# Patient Record
Sex: FEMALE | Race: WHITE | NOT HISPANIC OR LATINO
[De-identification: names, ages, dates, MRNs, and addresses within clinical notes are randomized per-mention and may not be internally consistent; named-entity substitution may affect disease eponyms.]

---

## 2019-03-11 ENCOUNTER — FORM ENCOUNTER (OUTPATIENT)
Age: 54
End: 2019-03-11

## 2019-11-17 ENCOUNTER — FORM ENCOUNTER (OUTPATIENT)
Age: 54
End: 2019-11-17

## 2020-12-20 ENCOUNTER — FORM ENCOUNTER (OUTPATIENT)
Age: 55
End: 2020-12-20

## 2021-01-13 ENCOUNTER — FORM ENCOUNTER (OUTPATIENT)
Age: 56
End: 2021-01-13

## 2022-02-22 PROBLEM — Z00.00 ENCOUNTER FOR PREVENTIVE HEALTH EXAMINATION: Status: ACTIVE | Noted: 2022-02-22

## 2022-03-01 ENCOUNTER — APPOINTMENT (OUTPATIENT)
Dept: OBGYN | Facility: CLINIC | Age: 57
End: 2022-03-01
Payer: COMMERCIAL

## 2022-03-01 ENCOUNTER — NON-APPOINTMENT (OUTPATIENT)
Age: 57
End: 2022-03-01

## 2022-03-01 VITALS
WEIGHT: 163 LBS | BODY MASS INDEX: 30 KG/M2 | TEMPERATURE: 98.1 F | HEART RATE: 79 BPM | HEIGHT: 62 IN | SYSTOLIC BLOOD PRESSURE: 143 MMHG | DIASTOLIC BLOOD PRESSURE: 84 MMHG

## 2022-03-01 PROCEDURE — 99396 PREV VISIT EST AGE 40-64: CPT

## 2022-03-01 RX ORDER — ESTRADIOL/NORETHINDRONE ACETATE TRANSDERMAL SYSTEM .05; .14 MG/D; MG/D
0.05-0.14 PATCH, EXTENDED RELEASE TRANSDERMAL
Qty: 3 | Refills: 3 | Status: ACTIVE | COMMUNITY
Start: 2022-03-01 | End: 1900-01-01

## 2022-03-01 NOTE — HISTORY OF PRESENT ILLNESS
[FreeTextEntry1] : here for annual visit\par hot flashes on and off...but  bad when they come\par interfering with her life quality\par \par previous visit with me in [place]\par \par    	Print\par Patient\par Name	CHELO ALMARAZ (54yo, F) ID# [id number]	Appt. Date/Time	2019 04:15PM\par 	1965	Service Dept.	[facility] SI OFFICE\par Provider	MOOSE ECHEVERRIA MD\par Insurance	\par Med Primary: [facility]\par Insurance # : [id number]\par Policy/Group # : [id number]\par Prescription: ESI1 - Member is eligible. details\par Chief Complaint\par Well Woman Visit\par Patient's Care Team\par Primary Care Provider: HANNAH RUTHERFORD MO: [address] [place] [address], Ph [phone], Fax [phone] NPI: [id number]\par Patient's Pharmacies\par [id number] ([facility]): [address] [place] [address], Ph [phone], Fax [phone]\par [id number] ([facility]): [address] [place] [address], Ph [phone], Fax [phone]\par Vitals\par 2019 05:08 pm\par Ht:	5 ft 2 in\par Wt:	159 lbs\par BMI:	29.1\par BP:	122/74 sitting\par Allergies\par Reviewed Allergies\par NKDA\par Medications\par Reviewed Medications\par amoxicillin 500 mg capsule\par 18   filled	[facility]\par amoxicillin 875 mg-potassium clavulanate 125 mg tablet\par 17   filled	[facility]\par DULoxetine 30 mg capsule,delayed release\par 17   filled	[facility]\par DULoxetine 60 mg capsule,delayed release\par 17   filled	[facility]\par Farxiga 5 mg tablet\par 06/17/15   filled	[facility]\par glimepiride 4 mg tablet\par 16   filled	[facility]\par halobetasol propionate 0.05 % topical cream\par 18   filled	[facility]\par hydrocortisone 2.5 % lotion\par 18   filled	[facility]\par hydrOXYzine HCl 10 mg tablet\par 18   filled	[facility]\par ibuprofen 800 mg tablet\par 19   filled	[facility]\par irbesartan 150 mg tablet\par 16   filled	[facility]\par Jardiance 25 mg tablet\par 09/10/16   filled	[facility]\par Jentadueto 2.5 mg-1,000 mg tablet\par 10/10/18   filled	[facility]\par Jentadueto XR 2.5 mg-1,000 mg tablet, extended release\par 10/17/18   filled	[facility]\par levothyroxine 125 mcg tablet\par 16   filled	[facility]\par meloxicam 7.5 mg tablet\par 18   filled	[facility]\par metFORMIN 1,000 mg tablet\par 17   filled	[facility]\par pioglitazone 15 mg tablet\par 18   filled	[facility]\par pravastatin 20 mg tablet\par 16   filled	[facility]\par predniSONE 20 mg tablet\par 18   filled	[facility]\par Prempro 0.3 mg-1.5 mg tablet\par Take 1 tablet(s) every day by oral route for 90 days.\par 19   prescribed	Moose Echeverria MD\par promethazine-DM 6.25 mg-15 mg/5 mL oral syrup\par 18   filled	[facility]\par rosuvastatin 20 mg tablet\par 19   filled	[facility]\par simvastatin 40 mg tablet\par 16   filled	[facility]\par Soliqua 100/33 100 unit-33 mcg/mL subcutaneous insulin pen\par 18   filled	[facility]\par Synthroid 150 mcg tablet\par 18   filled	[facility]\par Synthroid 175 mcg tablet\par 19   filled	[facility]\par tiZANidine 4 mg tablet\par 18   filled	[facility]\par Toujeo SoloStar U-300 Insulin 300 unit/mL (1.5 mL) subcutaneous pen\par 19   filled	[facility]\par Trulicity 0.75 mg/0.5 mL subcutaneous pen injector\par 02/15/17   filled	[facility]\par Unifine Pentips Plus 31 gauge x " needle\par 18   filled	[facility]\par Unithroid 112 mcg tablet\par 16   filled	[facility]\par valACYclovir 1 gram tablet\par 11/15/18   filled	[facility]\par Vitamin D2 50,000 unit capsule\par 16   filled	[facility]\par Problems\par Reviewed Problems\par Vaginitis and vulvovaginitis\par Cystocele\par Urinary incontinence\par Gynecologic examination\par Family History\par Reviewed Family History\par Mother	- Myocardial infarction\par Father	- Malignant tumor of pharynx\par Social History\par Reviewed Social History\par Smoking Status: Former smoker\par Smoker (/ PPD)\par Surgical History\par Reviewed Surgical History\par Other - CARPAL TUNNEL\par Other - CERVIX CRYOSURGERY\par GYN History\par Reviewed GYN History\par LMP: Definite.\par Date of LMP: 05/10/2009.\par Obstetric History\par Reviewed Obstetric History\par TOTAL	FULL	PRE	AB. I	AB. S	ECTOPICS	MULTIPLE	LIVING\par 2	2						2\par  1 CS\par Past Medical History\par Reviewed Past Medical History\par Diabetes: Y\par Heart Conditions: Y - CHOLESTEROL\par Thyroid Problems: Y\par Screening\par None recorded.\par Assessment / Plan\par 1. Gynecologic examination\par Z01.411: Encounter for gynecological examination (general) (routine) with abnormal findings\par MAMMO, SCREENING, BILATERAL\par THINPREPTIS,HPV,E6/E7W/RF\par \par 2. Menopausal and postmenopausal disorders\par N95.9: Unspecified menopausal and perimenopausal disorder\par Prempro 0.3 mg-1.5 mg tablet - Take 1 tablet(s) every day by oral route for 90 days.     Qty: 90 tablet(s)     Refills: 3     Pharmacy: [facility]\par Prempro 0.3 mg-1.5 mg tablet - Take 1 tablet(s) every day by oral route for 28 days.     Qty: 28 tablet(s)     Refills: 3     Pharmacy: [id number]\par \par \par Return to Office\par None recorded.\par Encounter Sign-Off\par Encounter signed-off by Moose Echeverria MD, 2019.\par Encounter performed and documented by Moose Echeverria MD\par Encounter reviewed & signed by Moose Echeverria MD on 2019 at 5:40pm [Hot Flashes] : hot flashes [Night Sweats] : night sweats

## 2022-03-05 LAB — HPV HIGH+LOW RISK DNA PNL CVX: NOT DETECTED

## 2022-03-12 LAB — CYTOLOGY CVX/VAG DOC THIN PREP: NORMAL

## 2022-04-02 ENCOUNTER — RESULT REVIEW (OUTPATIENT)
Age: 57
End: 2022-04-02

## 2022-04-02 ENCOUNTER — OUTPATIENT (OUTPATIENT)
Dept: OUTPATIENT SERVICES | Facility: HOSPITAL | Age: 57
LOS: 1 days | Discharge: HOME | End: 2022-04-02
Payer: COMMERCIAL

## 2022-04-02 DIAGNOSIS — R92.2 INCONCLUSIVE MAMMOGRAM: ICD-10-CM

## 2022-04-02 DIAGNOSIS — Z12.31 ENCOUNTER FOR SCREENING MAMMOGRAM FOR MALIGNANT NEOPLASM OF BREAST: ICD-10-CM

## 2022-04-02 PROCEDURE — 77067 SCR MAMMO BI INCL CAD: CPT | Mod: 26

## 2022-04-02 PROCEDURE — 76641 ULTRASOUND BREAST COMPLETE: CPT | Mod: 26,50

## 2022-04-02 PROCEDURE — 77063 BREAST TOMOSYNTHESIS BI: CPT | Mod: 26

## 2022-09-06 PROBLEM — Z00.00 ENCOUNTER FOR PREVENTIVE HEALTH EXAMINATION: Noted: 2022-09-06

## 2022-09-07 ENCOUNTER — RESULT CHARGE (OUTPATIENT)
Age: 57
End: 2022-09-07

## 2022-09-07 ENCOUNTER — APPOINTMENT (OUTPATIENT)
Dept: CARDIOLOGY | Facility: CLINIC | Age: 57
End: 2022-09-07

## 2022-09-07 VITALS
HEART RATE: 70 BPM | SYSTOLIC BLOOD PRESSURE: 120 MMHG | HEIGHT: 62 IN | WEIGHT: 168 LBS | DIASTOLIC BLOOD PRESSURE: 80 MMHG | OXYGEN SATURATION: 98 % | TEMPERATURE: 97.2 F | BODY MASS INDEX: 30.91 KG/M2 | RESPIRATION RATE: 16 BRPM

## 2022-09-07 DIAGNOSIS — Z87.891 PERSONAL HISTORY OF NICOTINE DEPENDENCE: ICD-10-CM

## 2022-09-07 DIAGNOSIS — Z78.9 OTHER SPECIFIED HEALTH STATUS: ICD-10-CM

## 2022-09-07 PROCEDURE — 99204 OFFICE O/P NEW MOD 45 MIN: CPT | Mod: 25

## 2022-09-07 PROCEDURE — 93000 ELECTROCARDIOGRAM COMPLETE: CPT

## 2022-09-07 RX ORDER — ASPIRIN 81 MG
81 TABLET, DELAYED RELEASE (ENTERIC COATED) ORAL DAILY
Refills: 0 | Status: ACTIVE | COMMUNITY

## 2022-09-07 RX ORDER — LEVOTHYROXINE SODIUM 0.11 MG/1
112 TABLET ORAL DAILY
Refills: 0 | Status: ACTIVE | COMMUNITY

## 2022-09-07 RX ORDER — PIOGLITAZONE HYDROCHLORIDE 30 MG/1
30 TABLET ORAL DAILY
Refills: 0 | Status: ACTIVE | COMMUNITY

## 2022-09-07 RX ORDER — ROSUVASTATIN CALCIUM 40 MG/1
40 TABLET, FILM COATED ORAL DAILY
Refills: 0 | Status: ACTIVE | COMMUNITY

## 2022-09-07 RX ORDER — BLOOD SUGAR DIAGNOSTIC
100 STRIP MISCELLANEOUS DAILY
Refills: 0 | Status: ACTIVE | COMMUNITY

## 2022-09-07 RX ORDER — SITAGLIPTIN AND METFORMIN HYDROCHLORIDE 50; 1000 MG/1; MG/1
50-1000 TABLET, FILM COATED ORAL DAILY
Refills: 0 | Status: ACTIVE | COMMUNITY

## 2022-09-07 NOTE — ASSESSMENT
[FreeTextEntry1] : Ms. Patel is a 56-year-old woman with history of insulin-dependent diabetes, prior tobacco use (quit 2018), and carpal tunnel syndrome s/p surgical repair presenting for cardiovascular risk assessment.\par \par Impression:\par (1) Mild dyspnea on exertion, stable, likely due to deconditioning.\par (2) IDDM2\par (3) HLD\par (4) Retinal vein occlusion\par (5) Prior tobacco use\par \par Plan:\par - TTE to evaluate for structural heart disease.\par - Close monitoring of symptoms, if worsening would order for CCTA or exercise stress echo\par - Cardiometabolic labs\par - Continue statin for history of DM2\par - OK to continue aspirin. Though not clearly indicated for RVO, may be helpful in the context of concurrent DM2.\par - Discussed diet and exercise recommendations in detail.\par - Return to clinic in 1-2 months

## 2022-09-07 NOTE — HISTORY OF PRESENT ILLNESS
[FreeTextEntry1] : Ms. Patel is a 56-year-old woman with history of insulin-dependent diabetes, prior tobacco use (quit 2018), and carpal tunnel syndrome s/p surgical repair presenting for cardiovascular risk assessment.\par \par Patient recently saw her ophthalmologist for an eyelid droop abnormality and was found on retinal exam to have retinal vein occlusion. She was started on aspirin and advised to follow up with a cardiologist.\par \par At baseline, she is relatively sedentary but does walk her dogs daily. She endorses some dyspnea with exertion that has been stable but denies chest pain, palpitations, pre-syncope, syncope, LE swelling, PND, or orthopnea. Her main complaint is of persistent fatigue with difficulty sleeping and snoring. She was seen by pulmonary in the past and advised sleep apnea testing but was never scheduled due to the onset of the COVID-19 pandemic.\par \par ECG shows NSR, normal axis, low voltage.\par \par Her mother had heart issues diagnosed in her 50s/60s.

## 2022-09-14 ENCOUNTER — APPOINTMENT (OUTPATIENT)
Dept: OBGYN | Facility: CLINIC | Age: 57
End: 2022-09-14

## 2022-09-21 ENCOUNTER — APPOINTMENT (OUTPATIENT)
Dept: CARDIOLOGY | Facility: CLINIC | Age: 57
End: 2022-09-21

## 2022-09-21 LAB
ANION GAP SERPL CALC-SCNC: 16 MMOL/L
APO B SERPL-MCNC: 64 MG/DL
APO LP(A) SERPL-MCNC: 18.6 NMOL/L
BUN SERPL-MCNC: 11 MG/DL
CALCIUM SERPL-MCNC: 9.6 MG/DL
CHLORIDE SERPL-SCNC: 96 MMOL/L
CHOLEST SERPL-MCNC: 159 MG/DL
CO2 SERPL-SCNC: 26 MMOL/L
CREAT SERPL-MCNC: 0.7 MG/DL
CRP SERPL HS-MCNC: 1.76 MG/L
EGFR: 100 ML/MIN/1.73M2
GLUCOSE SERPL-MCNC: 102 MG/DL
HDLC SERPL-MCNC: 64 MG/DL
LDLC SERPL CALC-MCNC: 65 MG/DL
NONHDLC SERPL-MCNC: 95 MG/DL
NT-PROBNP SERPL-MCNC: 51 PG/ML
POTASSIUM SERPL-SCNC: 4.2 MMOL/L
SODIUM SERPL-SCNC: 138 MMOL/L
TRIGL SERPL-MCNC: 150 MG/DL

## 2022-09-21 PROCEDURE — 93306 TTE W/DOPPLER COMPLETE: CPT

## 2022-10-10 ENCOUNTER — APPOINTMENT (OUTPATIENT)
Dept: PULMONOLOGY | Facility: CLINIC | Age: 57
End: 2022-10-10

## 2022-10-10 VITALS
WEIGHT: 167 LBS | SYSTOLIC BLOOD PRESSURE: 130 MMHG | BODY MASS INDEX: 30.73 KG/M2 | HEART RATE: 87 BPM | HEIGHT: 62 IN | DIASTOLIC BLOOD PRESSURE: 80 MMHG | OXYGEN SATURATION: 98 %

## 2022-10-10 DIAGNOSIS — Z86.39 PERSONAL HISTORY OF OTHER ENDOCRINE, NUTRITIONAL AND METABOLIC DISEASE: ICD-10-CM

## 2022-10-10 PROCEDURE — 99203 OFFICE O/P NEW LOW 30 MIN: CPT

## 2022-11-21 ENCOUNTER — APPOINTMENT (OUTPATIENT)
Dept: CARDIOLOGY | Facility: CLINIC | Age: 57
End: 2022-11-21

## 2022-11-21 VITALS
BODY MASS INDEX: 30.91 KG/M2 | TEMPERATURE: 97.9 F | WEIGHT: 168 LBS | HEART RATE: 93 BPM | HEIGHT: 62 IN | OXYGEN SATURATION: 94 % | SYSTOLIC BLOOD PRESSURE: 130 MMHG | DIASTOLIC BLOOD PRESSURE: 80 MMHG

## 2022-11-21 DIAGNOSIS — Z79.4 TYPE 2 DIABETES MELLITUS W/OUT COMPLICATIONS: ICD-10-CM

## 2022-11-21 DIAGNOSIS — H34.8192 CENTRAL RETINAL VEIN OCCLUSION, UNSPECIFIED EYE, STABLE: ICD-10-CM

## 2022-11-21 DIAGNOSIS — E11.9 TYPE 2 DIABETES MELLITUS W/OUT COMPLICATIONS: ICD-10-CM

## 2022-11-21 DIAGNOSIS — R53.83 OTHER FATIGUE: ICD-10-CM

## 2022-11-21 PROCEDURE — 93000 ELECTROCARDIOGRAM COMPLETE: CPT

## 2022-11-21 PROCEDURE — 99214 OFFICE O/P EST MOD 30 MIN: CPT | Mod: 25

## 2022-11-21 NOTE — HISTORY OF PRESENT ILLNESS
[FreeTextEntry1] : Ms. Patel is a 56-year-old woman with history of insulin-dependent diabetes, prior tobacco use (quit 2018), and carpal tunnel syndrome s/p surgical repair presenting for cardiovascular risk assessment.\par \par Patient recently saw her ophthalmologist for an eyelid droop abnormality and was found on retinal exam to have retinal vein occlusion. She was started on aspirin and advised to follow up with a cardiologist.\par \par At baseline, she is relatively sedentary but does walk her dogs daily. She endorses some dyspnea with exertion that has been stable but denies chest pain, palpitations, pre-syncope, syncope, LE swelling, PND, or orthopnea. Her main complaint is of persistent fatigue with difficulty sleeping and snoring. She was seen by pulmonary in the past and advised sleep apnea testing but was never scheduled due to the onset of the COVID-19 pandemic.\par \par She underwent TTE as below which was grossly unremarkable. She continues to have significant exertion limiting fatigue.\par \par ECG shows NSR, normal axis, low voltage.\par \par Her mother had heart issues diagnosed in her 50s/60s.\par \par TTE 9/2022\par - Normal biventricular function, indeterminant diastolic function, no hemodynamically significant valvular stenosis\par \par Labs:\par - , , LDL 65, non-HDL 95, HDL 64, ApoB 64, Lp(A) 18.6 nmol/L\par - Cr 0.7, K 4.2\par - hsCRP 1.76, pBNP 51

## 2022-11-21 NOTE — ASSESSMENT
[FreeTextEntry1] : Ms. Patel is a 56-year-old woman with history of insulin-dependent diabetes, prior tobacco use (quit 2018), and carpal tunnel syndrome s/p surgical repair presenting for cardiovascular risk assessment.\par \par Impression:\par (1) Mild dyspnea on exertion, stable, likely due to deconditioning.\par (2) IDDM2\par (3) HLD\par (4) Retinal vein occlusion\par (5) Prior tobacco use\par \par Plan:\par - Stress Echo (does not require full resting study) to evaluate dyspnea and fatigue.\par - Consider PYP scan in the future given hx of bilateral carpal tunnel.\par - Continue statin for history of DM2\par - OK to continue aspirin. Though not clearly indicated for RVO, may be helpful in the context of concurrent DM2.\par - Discussed diet and exercise recommendations in detail.\par - Return to clinic after stress testing.

## 2022-12-10 NOTE — HISTORY OF PRESENT ILLNESS
LDL:112.8  Atorvastatin 40mg daily  Stroke RF   [TextBox_4] : 57-year-old female who works as a , former smoker of about 35 pack years and quit 4 and half years ago, presenting for evaluation of COPD and sleep apnea.  She is known to snore loudly, has had witnessed apneas in the past, nocturia, morning headaches, daytime sleepiness and fatigue.  No shortness of breath and occasional cough.  She never had PFTs or low-dose CT of the chest done.

## 2022-12-12 ENCOUNTER — APPOINTMENT (OUTPATIENT)
Dept: CARDIOLOGY | Facility: CLINIC | Age: 57
End: 2022-12-12

## 2022-12-13 ENCOUNTER — APPOINTMENT (OUTPATIENT)
Dept: CARDIOLOGY | Facility: CLINIC | Age: 57
End: 2022-12-13

## 2022-12-17 ENCOUNTER — APPOINTMENT (OUTPATIENT)
Dept: SLEEP CENTER | Facility: HOSPITAL | Age: 57
End: 2022-12-17

## 2022-12-17 ENCOUNTER — OUTPATIENT (OUTPATIENT)
Dept: OUTPATIENT SERVICES | Facility: HOSPITAL | Age: 57
LOS: 1 days | Discharge: HOME | End: 2022-12-17

## 2022-12-17 PROCEDURE — 95811 POLYSOM 6/>YRS CPAP 4/> PARM: CPT | Mod: 26

## 2022-12-19 DIAGNOSIS — G47.33 OBSTRUCTIVE SLEEP APNEA (ADULT) (PEDIATRIC): ICD-10-CM

## 2023-03-04 ENCOUNTER — LABORATORY RESULT (OUTPATIENT)
Age: 58
End: 2023-03-04

## 2023-03-07 ENCOUNTER — OUTPATIENT (OUTPATIENT)
Dept: OUTPATIENT SERVICES | Facility: HOSPITAL | Age: 58
LOS: 1 days | End: 2023-03-07
Payer: COMMERCIAL

## 2023-03-07 DIAGNOSIS — R06.02 SHORTNESS OF BREATH: ICD-10-CM

## 2023-03-07 PROCEDURE — 94729 DIFFUSING CAPACITY: CPT

## 2023-03-07 PROCEDURE — 94070 EVALUATION OF WHEEZING: CPT

## 2023-03-07 PROCEDURE — 94726 PLETHYSMOGRAPHY LUNG VOLUMES: CPT

## 2023-03-08 ENCOUNTER — APPOINTMENT (OUTPATIENT)
Dept: PULMONOLOGY | Facility: CLINIC | Age: 58
End: 2023-03-08
Payer: COMMERCIAL

## 2023-03-08 VITALS
SYSTOLIC BLOOD PRESSURE: 132 MMHG | HEART RATE: 87 BPM | HEIGHT: 62 IN | WEIGHT: 161 LBS | BODY MASS INDEX: 29.63 KG/M2 | OXYGEN SATURATION: 98 % | RESPIRATION RATE: 14 BRPM | DIASTOLIC BLOOD PRESSURE: 76 MMHG

## 2023-03-08 DIAGNOSIS — R06.02 SHORTNESS OF BREATH: ICD-10-CM

## 2023-03-08 PROCEDURE — 94729 DIFFUSING CAPACITY: CPT | Mod: 26

## 2023-03-08 PROCEDURE — 99214 OFFICE O/P EST MOD 30 MIN: CPT | Mod: 25

## 2023-03-08 PROCEDURE — 94060 EVALUATION OF WHEEZING: CPT | Mod: 26

## 2023-03-08 PROCEDURE — 94727 GAS DIL/WSHOT DETER LNG VOL: CPT | Mod: 26

## 2023-03-08 NOTE — HISTORY OF PRESENT ILLNESS
[TextBox_4] : 57-year-old female who works as a , former smoker of about 35 pack years and quit 4 and half years ago, presenting for evaluation of COPD and sleep apnea.  She is known to snore loudly, has had witnessed apneas in the past, nocturia, morning headaches, daytime sleepiness and fatigue.  No shortness of breath and occasional cough.  She never had PFTs or low-dose CT of the chest done.\par \par 3/8/23: Low-dose CT of the chest reviewed with evidence of sub-6 mm lung nodules that will need to be followed with repeat CAT scan in 6 months.  PFTs also reviewed today and were essentially within normal limits.  She has severe AMY and a CPAP of 7 cm of water has been already ordered but not yet received.  We will submit the order again.  Otherwise her lungs are clear on exam and she denies having any respiratory issues.  She does not use any inhalers.  Follow-up in 3 months.

## 2023-03-16 ENCOUNTER — APPOINTMENT (OUTPATIENT)
Dept: OBGYN | Facility: CLINIC | Age: 58
End: 2023-03-16

## 2023-07-20 ENCOUNTER — APPOINTMENT (OUTPATIENT)
Dept: OBGYN | Facility: CLINIC | Age: 58
End: 2023-07-20
Payer: COMMERCIAL

## 2023-07-20 VITALS
BODY MASS INDEX: 32.76 KG/M2 | SYSTOLIC BLOOD PRESSURE: 139 MMHG | DIASTOLIC BLOOD PRESSURE: 85 MMHG | HEART RATE: 76 BPM | WEIGHT: 178 LBS | HEIGHT: 62 IN

## 2023-07-20 DIAGNOSIS — Z01.419 ENCOUNTER FOR GYNECOLOGICAL EXAMINATION (GENERAL) (ROUTINE) W/OUT ABNORMAL FINDINGS: ICD-10-CM

## 2023-07-20 PROCEDURE — 99396 PREV VISIT EST AGE 40-64: CPT

## 2023-07-20 RX ORDER — ESTRADIOL AND NORETHINDRONE ACETATE .5; .1 MG/1; MG/1
0.5-0.1 TABLET, FILM COATED ORAL
Qty: 84 | Refills: 3 | Status: ACTIVE | COMMUNITY
Start: 2023-07-20 | End: 1900-01-01

## 2023-07-20 NOTE — HISTORY OF PRESENT ILLNESS
[FreeTextEntry1] : here for her annual\par last DAYRON 3 YEARS ago\par still with menopausal symptoms...severe flashes\par thyroid out of wack\par never started HRT\par mammo next month\par reduced libido\par \par \par    	Print\par Patient\par Name	CHELO ALMARAZ (56yo, F) ID# [id number]	Appt. Date/Time	2020 05:20PM\par 	1965	Service Dept.	[facility] SI OFFICE\par Provider	CLARISA QUEZADA MD\par Insurance	\par Med Primary: [facility]\par Insurance # : [id number]\par Policy/Group # : [id number]\par Prescription: [facility] - Member is eligible. details\par Prescription: [facility] - Member is eligible. details\par Chief Complaint\par annual gyn exam\par Patient's Care Team\par Primary Care Provider: HANNAH RUTHERFORD MO: [address] [place] [address], Ph [phone], Fax [phone] NPI: [id number]\par Patient's Pharmacies\par [id number] ([facility]): [address] [place] [address], Ph [phone], Fax [phone]\par [id number] ([facility]): [address] [place] [address], Ph [phone], Fax [phone]\par Vitals\par 2020 05:13 pm\par Ht:	5 ft 2 in\par Wt:	165 lbs\par BMI:	30.2\par T:	97.7 F°\par Allergies\par Reviewed Allergies\par NKDA\par Medications\par Reviewed Medications\par Afrezza (regular insulin) 8 unit (90)/12 unit (90) cartridge,inhaler\par INHALE 12 TO 36 UNITS THREE TIMES A DAY BEFORE MEALS AS DIRECTED\par 20   filled	[facility]\par Afrezza 4 unit (60)/8 unit (60)/12 unit (60) cartridge with inhaler\par 20   filled	[facility]\par amoxicillin 500 mg capsule\par TAKE ONE CAPSULE BY MOUTH THREE TIMES A DAY\par 10/14/20   filled	[facility]\par amoxicillin 500 mg tablet\par TAKE ONE TABLET BY MOUTH THREE TIMES A DAY\par 20   filled	[facility]\par amoxicillin 875 mg-potassium clavulanate 125 mg tablet\par 17   filled	[facility]\par Basaglar KwikPen U-100 Insulin 100 unit/mL (3 mL) subcutaneous\par 20   filled	[facility]\par cholecalciferol (vitamin D3) 1,250 mcg (50,000 unit) capsule\par TAKE ONE CAPSULE BY MOUTH ONCE A WEEK.\par 10/12/20   filled	[facility]\par ciprofloxacin 500 mg tablet\par TAKE ONE TABLET BY MOUTH TWICE A DAY FOR 14 DAYS\par 10/23/20   filled	[facility]\par clotrimazole-betamethasone 1 %-0.05 % topical cream\par Apply 1 application(s) twice a day by topical route as directed.\par 19   prescribed	Clarisa Quezada MD\par DULoxetine 30 mg capsule,delayed release\par 17   filled	[facility]\par DULoxetine 60 mg capsule,delayed release\par 17   filled	[facility]\par Farxiga 5 mg tablet\par 06/17/15   filled	[facility]\par fluconazole 150 mg tablet\par TAKE ONE TABLET BY MOUTH ONCE AS DIRECTED\par 20   filled	[facility]\par FreeStyle Freedom Lite kit\par USE AS DIRECTED\par 06/10/20   filled	[facility]\par FreeStyle Lancets 28 gauge\par TEST THREE TIMES A DAY\par 06/10/20   filled	[facility]\par FreeStyle Lite Strips\par TEST THREE TIMES A DAY\par 10/30/20   filled	[facility]\par glimepiride 4 mg tablet\par 16   filled	[facility]\par halobetasol propionate 0.05 % topical cream\par 18   filled	[facility]\par hydrocortisone 2.5 % lotion\par 18   filled	[facility]\par hydrOXYzine HCL 10 mg tablet\par 18   filled	[facility]\par ibuprofen 800 mg tablet\par TAKE ONE TABLET BY MOUTH EVERY 6 HOURS\par 10/12/20   [facility]\par irbesartan 150 mg tablet\par 16   filled	[facility]\par Janumet XR 50 mg-1,000 mg tablet,extended release\par TAKE TWO TABLETS BY MOUTH EVERY DAY\par 20   filled	[facility]\par Jardiance 25 mg tablet\par 09/10/16   filled	[facility]\par Jentadueto 2.5 mg-1,000 mg tablet\par 10/10/18   filled	[facility]\par Jentadueto XR 2.5 mg-1,000 mg tablet, extended release\par 19   filled	[facility]\par levothyroxine 125 mcg tablet\par 16   filled	[facility]\par levothyroxine 150 mcg tablet\par TAKE ONE TABLET BY MOUTH EVERY DAY\par 10/26/20   filled	[facility]\par meloxicam 7.5 mg tablet\par 18   filled	[facility]\par metFORMIN 1,000 mg tablet\par TAKE ONE TABLET BY MOUTH TWICE A DAY\par 20   filled	[facility]\par methylPREDNISolone 4 mg tablets in a dose pack\par FOLLOW PACKAGE INSTRUCTIONS\par 20   filled	[facility]\par neomycin-polymyxin-hydrocort 3.5 mg-10,000 unit/mL-1 % ear drops,susp\par INSTILL 4 DROPS INTO LEFT EAR THREE TIMES A DAY\par 20   filled	[facility]\par pioglitazone 15 mg tablet\par 20   filled	[facility]\par pravastatin 20 mg tablet\par 16   filled	[facility]\par predniSONE 20 mg tablet\par 18   filled	[facility]\par Prempro 0.3 mg-1.5 mg tablet\par Take 1 tablet(s) every day by oral route for 90 days.\par 19   filled	[facility]\par promethazine-DM 6.25 mg-15 mg/5 mL oral syrup\par TAKE 5 MLS ( ONE TEASPOONFUL ) BY MOUTH THREE TIMES A DAY\par 20   filled	[facility]\par rosuvastatin 20 mg tablet\par TAKE ONE TABLET BY MOUTH EVERY DAY\par 20   filled	[facility]\par simvastatin 40 mg tablet\par 16   filled	[facility]\par Soliqua 100/33 100 unit-33 mcg/mL subcutaneous insulin pen\par 18   filled	[facility]\par Synthroid 137 mcg tablet\par 20   filled	[facility]\par Synthroid 175 mcg tablet\par 11/15/19   filled	[facility]\par tiZANidine 4 mg tablet\par 18   filled	[facility]\par Toujeo Max U-300 SoloStar 300 unit/mL (3 mL) subcutaneous insulin pen\par 20   filled	[facility]\par Toujeo SoloStar U-300 Insulin 300 unit/mL (1.5 mL) subcutaneous pen\par 20   filled	[facility]\par Trulicity 0.75 mg/0.5 mL subcutaneous pen injector\par 02/15/17   filled	[facility]\par Unifine Pentips Plus 31 gauge x 5/16" needle\par 19   filled	[facility]\par Unithroid 112 mcg tablet\par 16   filled	[facility]\par valACYclovir 1 gram tablet\par 11/15/18   filled	[facility]\par Vitamin D2 1,250 mcg (50,000 unit) capsule\par 16   filled	[facility]\par Problems\par Reviewed Problems\par Vaginitis and vulvovaginitis\par Cystocele\par Urinary incontinence\par Gynecologic examination\par Family History\par Discussed Family History\par Mother	- Myocardial infarction\par Father	- Malignant tumor of pharynx\par Social History\par Discussed Social History\par 2019 novel coronavirus (2019-nCoV)\par Has patient visited an area known to be high risk for 2019 n-CoV?: N\par In the 14 days before symptom onset, did the patient spend time in [facility]?: N\par Tobacco Smoking Status: Former smoker\par Smoker (1/2 PPD)\par Smokeless Tobacco Status: Never used smokeless tobacco\par Tobacco-years of use: 0\par E-cigarette/Vape Status: Never used electronic cigarettes\par Most Recent Tobacco Use Screenin2020\par Surgical History\par Reviewed Surgical History\par Other - CARPAL TUNNEL\par Other - CERVIX CRYOSURGERY\par GYN History\par Reviewed GYN History\par LMP: Definite.\par Date of LMP: 05/10/2009.\par Obstetric History\par Reviewed Obstetric History\par TOTAL	FULL	PRE	AB. I	AB. S	ECTOPICS	MULTIPLE	LIVING\par 2	2						2\par  1 CS\par Past Medical History\par Discussed Past Medical History\par Diabetes: Y\par Heart Conditions: Y - CHOLESTEROL\par Thyroid Problems: Y\par Screening\par None recorded.\par HPI\par 55 year old here for annual exam, no complaints, menopausal for 11 years, denies interval bleeding or climacteric symptoms. Denies discharge, odor, itching or pain with urination, denies vaginal dryness\par \par ROS\par Patient reports no fatigue, no fever, no significant weight gain, and no significant weight loss. She reports no dyspnea / shortness of breath, no cough, and no wheezing. She reports no chest pain and no palpitations. She reports no nausea, no vomiting, no abdominal pain, no bowel movement changes, no diarrhea, no constipation, and no rectal bleeding. She reports no abnormal bleeding, no trouble urinating, no incontinence, no rash, no lesion, no discharge, no vaginal odor, and no vaginal itching. She reports no menopausal symptoms. She reports no headaches, no dizziness, and no weakness.\par Physical Exam\par Patient is a 55-year-old female.\par \par Chaperone: Chaperone: present.\par \par Constitutional: General Appearance: healthy-appearing, well-nourished, and well-developed.\par \par Psychiatric: Mood and Affect: normal mood and affect and active and alert.\par \par Skin: Appearance: no rashes or lesions.\par \par Lungs: Respiratory Effort: no intercostal retractions or accessory muscle usage.\par \par Cardiovascular: Peripheral Vascular: no LLE edema or RLE edema.\par \par Abdomen: Auscultation/Inspection/Palpation: soft, non-distended, and no tenderness. Hernia: none palpated.\par \par Breast: Inspection/Palpation: no tenderness, skin changes, abnormal secretions, or distinct masses and nipple appearance normal.\par \par Female Genitalia: Vulva: no masses, atrophy, or lesions. Vagina: no tenderness, erythema, cystocele, rectocele, abnormal vaginal discharge, or vesicle(s) or ulcers. Cervix: no discharge or cervical motion tenderness and grossly normal and sample taken for a Pap smear. Uterus: normal size and shape and midline, mobile, non-tender, and no uterine prolapse. Bladder/Urethra: no urethral discharge or mass and normal meatus and bladder non distended. Adnexa/Parametria: no parametrial tenderness or mass and no adnexal tenderness or ovarian mass.\par \par Lymph Nodes: Palpation: non tender axillary nodes or inguinal nodes.\par Assessment / Plan\par 1. Gynecologic examination -\par PCP for all other age appropriate testing\par \par Z01.419: Encounter for gynecological examination (general) (routine) without abnormal findings\par MAMMO, SCREENING, BILATERAL\par US, BREAST, BILATERAL\par URINALYSIS, DIPSTICK\par DEXA, AXIAL SKELETON + VERTEBRAL FRACTURE ASSESSMENT\par SUREPATH IMAGING PAP AND HPV DNA\par \par \par Return to Office\par None recorded.\par Encounter Sign-Off\par Encounter signed-off by Clarisa Quezada MD, 2020.\par Encounter performed and documented by Clarisa Quezada MD\par Encounter reviewed & signed by Clarisa Quezada MD on 2020 at 12:46pm

## 2023-07-23 LAB — HPV HIGH+LOW RISK DNA PNL CVX: NOT DETECTED

## 2023-08-01 LAB — CYTOLOGY CVX/VAG DOC THIN PREP: NORMAL

## 2023-09-07 ENCOUNTER — APPOINTMENT (OUTPATIENT)
Dept: PULMONOLOGY | Facility: CLINIC | Age: 58
End: 2023-09-07
Payer: COMMERCIAL

## 2023-09-07 VITALS
WEIGHT: 180 LBS | HEART RATE: 93 BPM | HEIGHT: 62 IN | RESPIRATION RATE: 14 BRPM | OXYGEN SATURATION: 93 % | DIASTOLIC BLOOD PRESSURE: 80 MMHG | BODY MASS INDEX: 33.13 KG/M2 | SYSTOLIC BLOOD PRESSURE: 132 MMHG

## 2023-09-07 PROCEDURE — 99214 OFFICE O/P EST MOD 30 MIN: CPT

## 2023-09-07 NOTE — ASSESSMENT
[FreeTextEntry1] : AMY - severe initially with AHI of 81 Compliant with CPAP 7  No issues  Residual AHI is 0.7 - very compliant - benefiting from use   Former smoker  PFTs; mild DLCO reduction - otherwise normal   Lung nodules  Repeat CT with stability over 7 months  Repeat CT for next year ordered   F/U in 6 months

## 2023-09-07 NOTE — HISTORY OF PRESENT ILLNESS
[TextBox_4] : 57-year-old female who works as a , former smoker of about 35 pack years and quit 4 and half years ago, presenting for evaluation of COPD and sleep apnea.  She is known to snore loudly, has had witnessed apneas in the past, nocturia, morning headaches, daytime sleepiness and fatigue.  No shortness of breath and occasional cough.  She never had PFTs or low-dose CT of the chest done.  9/7/23: Doing well; no respiratory issues; tolerating CPAP. CT chest done and reviewed. Lungs clear today.

## 2023-09-11 ENCOUNTER — NON-APPOINTMENT (OUTPATIENT)
Age: 58
End: 2023-09-11

## 2023-09-12 ENCOUNTER — APPOINTMENT (OUTPATIENT)
Dept: OBGYN | Facility: CLINIC | Age: 58
End: 2023-09-12

## 2023-10-04 ENCOUNTER — RESULT REVIEW (OUTPATIENT)
Age: 58
End: 2023-10-04

## 2023-10-04 ENCOUNTER — OUTPATIENT (OUTPATIENT)
Dept: OUTPATIENT SERVICES | Facility: HOSPITAL | Age: 58
LOS: 1 days | End: 2023-10-04
Payer: COMMERCIAL

## 2023-10-04 DIAGNOSIS — Z12.31 ENCOUNTER FOR SCREENING MAMMOGRAM FOR MALIGNANT NEOPLASM OF BREAST: ICD-10-CM

## 2023-10-04 PROCEDURE — 77067 SCR MAMMO BI INCL CAD: CPT | Mod: 26

## 2023-10-04 PROCEDURE — 76641 ULTRASOUND BREAST COMPLETE: CPT | Mod: 26,50

## 2023-10-04 PROCEDURE — 77063 BREAST TOMOSYNTHESIS BI: CPT | Mod: 26

## 2023-10-04 PROCEDURE — 77067 SCR MAMMO BI INCL CAD: CPT

## 2023-10-04 PROCEDURE — 76641 ULTRASOUND BREAST COMPLETE: CPT | Mod: 50

## 2023-10-04 PROCEDURE — 77063 BREAST TOMOSYNTHESIS BI: CPT

## 2023-10-05 DIAGNOSIS — Z12.31 ENCOUNTER FOR SCREENING MAMMOGRAM FOR MALIGNANT NEOPLASM OF BREAST: ICD-10-CM

## 2024-02-07 ENCOUNTER — APPOINTMENT (OUTPATIENT)
Dept: OBGYN | Facility: CLINIC | Age: 59
End: 2024-02-07
Payer: COMMERCIAL

## 2024-02-07 VITALS
HEIGHT: 62 IN | BODY MASS INDEX: 32.76 KG/M2 | DIASTOLIC BLOOD PRESSURE: 83 MMHG | SYSTOLIC BLOOD PRESSURE: 143 MMHG | WEIGHT: 178 LBS | HEART RATE: 111 BPM

## 2024-02-07 DIAGNOSIS — N95.0 POSTMENOPAUSAL BLEEDING: ICD-10-CM

## 2024-02-07 DIAGNOSIS — N95.1 MENOPAUSAL AND FEMALE CLIMACTERIC STATES: ICD-10-CM

## 2024-02-07 PROCEDURE — 99213 OFFICE O/P EST LOW 20 MIN: CPT | Mod: 25

## 2024-02-07 PROCEDURE — 76830 TRANSVAGINAL US NON-OB: CPT

## 2024-02-07 RX ORDER — PAROXETINE HYDROCHLORIDE 10 MG/1
10 TABLET, FILM COATED ORAL
Qty: 90 | Refills: 3 | Status: ACTIVE | COMMUNITY
Start: 2024-02-07 | End: 1900-01-01

## 2024-04-22 ENCOUNTER — APPOINTMENT (OUTPATIENT)
Dept: PULMONOLOGY | Facility: CLINIC | Age: 59
End: 2024-04-22
Payer: COMMERCIAL

## 2024-04-22 VITALS
OXYGEN SATURATION: 94 % | BODY MASS INDEX: 32.76 KG/M2 | HEART RATE: 75 BPM | HEIGHT: 62 IN | DIASTOLIC BLOOD PRESSURE: 70 MMHG | WEIGHT: 178 LBS | SYSTOLIC BLOOD PRESSURE: 132 MMHG | RESPIRATION RATE: 14 BRPM

## 2024-04-22 DIAGNOSIS — Z87.891 PERSONAL HISTORY OF NICOTINE DEPENDENCE: ICD-10-CM

## 2024-04-22 DIAGNOSIS — G47.30 SLEEP APNEA, UNSPECIFIED: ICD-10-CM

## 2024-04-22 DIAGNOSIS — R91.8 OTHER NONSPECIFIC ABNORMAL FINDING OF LUNG FIELD: ICD-10-CM

## 2024-04-22 PROCEDURE — 99213 OFFICE O/P EST LOW 20 MIN: CPT

## 2024-04-22 NOTE — REVIEW OF SYSTEMS
[Recent Wt Gain (___ Lbs)] : ~T no recent weight gain [EDS] : no EDS [Negative] : Endocrine [TextBox_3] : EDS has resolved

## 2024-04-22 NOTE — HISTORY OF PRESENT ILLNESS
[TextBox_4] : 57-year-old female who works as a , former smoker of about 35 pack years and quit 4 and half years ago, presenting for evaluation of COPD and sleep apnea.  She is known to snore loudly, has had witnessed apneas in the past, nocturia, morning headaches, daytime sleepiness and fatigue.  No shortness of breath and occasional cough.  She never had PFTs or low-dose CT of the chest done.  9/7/23: Doing well; no respiratory issues; tolerating CPAP. CT chest done and reviewed. Lungs clear today.   4/22/24: No respiratory issues. CT noted and is stable. AMY, compliant with CPAP. Benefiting from use.

## 2024-04-22 NOTE — ASSESSMENT
[FreeTextEntry1] : AMY - severe initially with AHI of 81 Compliant with CPAP 7  No issues  Compliance data requested  Benefiting from use  Gets supplies periodically   Former smoker  PFTs; mild DLCO reduction - otherwise normal  No respiratory issues at present   Lung nodules  Ct stable with sub 6 mm nodules  CT in 1 year   1 year follow up

## 2024-10-15 ENCOUNTER — RESULT REVIEW (OUTPATIENT)
Age: 59
End: 2024-10-15

## 2024-10-15 ENCOUNTER — OUTPATIENT (OUTPATIENT)
Dept: OUTPATIENT SERVICES | Facility: HOSPITAL | Age: 59
LOS: 1 days | End: 2024-10-15
Payer: COMMERCIAL

## 2024-10-15 DIAGNOSIS — Z12.31 ENCOUNTER FOR SCREENING MAMMOGRAM FOR MALIGNANT NEOPLASM OF BREAST: ICD-10-CM

## 2024-10-15 DIAGNOSIS — R92.2 INCONCLUSIVE MAMMOGRAM: ICD-10-CM

## 2024-10-15 PROCEDURE — 77063 BREAST TOMOSYNTHESIS BI: CPT | Mod: 26

## 2024-10-15 PROCEDURE — 76641 ULTRASOUND BREAST COMPLETE: CPT | Mod: 26,50

## 2024-10-15 PROCEDURE — 77067 SCR MAMMO BI INCL CAD: CPT

## 2024-10-15 PROCEDURE — 76641 ULTRASOUND BREAST COMPLETE: CPT | Mod: 50

## 2024-10-15 PROCEDURE — 77067 SCR MAMMO BI INCL CAD: CPT | Mod: 26

## 2024-10-15 PROCEDURE — 77063 BREAST TOMOSYNTHESIS BI: CPT

## 2024-10-16 DIAGNOSIS — Z12.31 ENCOUNTER FOR SCREENING MAMMOGRAM FOR MALIGNANT NEOPLASM OF BREAST: ICD-10-CM

## 2024-10-16 DIAGNOSIS — R92.2 INCONCLUSIVE MAMMOGRAM: ICD-10-CM

## 2024-10-18 DIAGNOSIS — Z12.39 ENCOUNTER FOR OTHER SCREENING FOR MALIGNANT NEOPLASM OF BREAST: ICD-10-CM

## 2024-10-23 ENCOUNTER — APPOINTMENT (OUTPATIENT)
Dept: OBGYN | Facility: CLINIC | Age: 59
End: 2024-10-23
Payer: COMMERCIAL

## 2024-10-23 ENCOUNTER — LABORATORY RESULT (OUTPATIENT)
Age: 59
End: 2024-10-23

## 2024-10-23 VITALS
SYSTOLIC BLOOD PRESSURE: 130 MMHG | BODY MASS INDEX: 31.65 KG/M2 | HEIGHT: 62 IN | DIASTOLIC BLOOD PRESSURE: 84 MMHG | WEIGHT: 172 LBS | HEART RATE: 80 BPM

## 2024-10-23 DIAGNOSIS — N95.1 MENOPAUSAL AND FEMALE CLIMACTERIC STATES: ICD-10-CM

## 2024-10-23 DIAGNOSIS — R31.29 OTHER MICROSCOPIC HEMATURIA: ICD-10-CM

## 2024-10-23 DIAGNOSIS — Z01.419 ENCOUNTER FOR GYNECOLOGICAL EXAMINATION (GENERAL) (ROUTINE) W/OUT ABNORMAL FINDINGS: ICD-10-CM

## 2024-10-23 DIAGNOSIS — R82.998 OTHER ABNORMAL FINDINGS IN URINE: ICD-10-CM

## 2024-10-23 LAB
BILIRUB UR QL STRIP: NEGATIVE
CLARITY UR: NORMAL
COLLECTION METHOD: NORMAL
GLUCOSE UR-MCNC: NEGATIVE
HCG UR QL: 1 EU/DL
HGB UR QL STRIP.AUTO: ABNORMAL
KETONES UR-MCNC: ABNORMAL
LEUKOCYTE ESTERASE UR QL STRIP: ABNORMAL
NITRITE UR QL STRIP: NEGATIVE
PH UR STRIP: 5.5
PROT UR STRIP-MCNC: ABNORMAL
SP GR UR STRIP: >=1.03

## 2024-10-23 PROCEDURE — 99396 PREV VISIT EST AGE 40-64: CPT

## 2024-10-23 PROCEDURE — 81003 URINALYSIS AUTO W/O SCOPE: CPT | Mod: QW

## 2024-10-23 PROCEDURE — 99459 PELVIC EXAMINATION: CPT

## 2024-10-28 LAB
APPEARANCE: CLEAR
BACTERIA UR CULT: NORMAL
BILIRUBIN URINE: NEGATIVE
BLOOD URINE: NEGATIVE
COLOR: NORMAL
GLUCOSE QUALITATIVE U: NEGATIVE MG/DL
HPV HIGH+LOW RISK DNA PNL CVX: NOT DETECTED
KETONES URINE: NEGATIVE MG/DL
LEUKOCYTE ESTERASE URINE: ABNORMAL
NITRITE URINE: NEGATIVE
PH URINE: 6
PROTEIN URINE: 100 MG/DL
SPECIFIC GRAVITY URINE: 1.02
UROBILINOGEN URINE: 0.2 MG/DL

## 2024-10-30 LAB — CYTOLOGY CVX/VAG DOC THIN PREP: NORMAL

## 2025-03-07 ENCOUNTER — RX RENEWAL (OUTPATIENT)
Age: 60
End: 2025-03-07

## 2025-03-07 DIAGNOSIS — E78.00 PURE HYPERCHOLESTEROLEMIA, UNSPECIFIED: ICD-10-CM

## 2025-04-28 ENCOUNTER — APPOINTMENT (OUTPATIENT)
Dept: PULMONOLOGY | Facility: CLINIC | Age: 60
End: 2025-04-28